# Patient Record
Sex: FEMALE | Race: WHITE
[De-identification: names, ages, dates, MRNs, and addresses within clinical notes are randomized per-mention and may not be internally consistent; named-entity substitution may affect disease eponyms.]

---

## 2017-01-09 ENCOUNTER — HOSPITAL ENCOUNTER (OUTPATIENT)
Dept: HOSPITAL 62 - LAB | Age: 47
End: 2017-01-09
Attending: PHYSICIAN ASSISTANT
Payer: MEDICARE

## 2017-01-09 DIAGNOSIS — R07.82: Primary | ICD-10-CM

## 2017-01-09 DIAGNOSIS — R10.812: ICD-10-CM

## 2017-01-09 LAB — CREAT SERPL-MCNC: 1.1 MG/DL (ref 0.52–1.25)

## 2017-01-09 PROCEDURE — 36415 COLL VENOUS BLD VENIPUNCTURE: CPT

## 2017-01-09 PROCEDURE — 82565 ASSAY OF CREATININE: CPT

## 2017-01-10 ENCOUNTER — HOSPITAL ENCOUNTER (OUTPATIENT)
Dept: HOSPITAL 62 - RAD | Age: 47
End: 2017-01-10
Attending: INTERNAL MEDICINE
Payer: MEDICARE

## 2017-01-10 DIAGNOSIS — R07.82: ICD-10-CM

## 2017-01-10 DIAGNOSIS — R10.12: Primary | ICD-10-CM

## 2017-01-10 PROCEDURE — 74160 CT ABDOMEN W/CONTRAST: CPT

## 2018-02-09 ENCOUNTER — HOSPITAL ENCOUNTER (EMERGENCY)
Dept: HOSPITAL 62 - ER | Age: 48
Discharge: LEFT BEFORE DISCHARGE | End: 2018-02-09
Payer: MEDICARE

## 2018-02-09 VITALS — SYSTOLIC BLOOD PRESSURE: 147 MMHG | DIASTOLIC BLOOD PRESSURE: 64 MMHG

## 2018-02-09 DIAGNOSIS — R06.02: ICD-10-CM

## 2018-02-09 DIAGNOSIS — R07.9: ICD-10-CM

## 2018-02-09 DIAGNOSIS — I10: ICD-10-CM

## 2018-02-09 DIAGNOSIS — E11.9: ICD-10-CM

## 2018-02-09 DIAGNOSIS — E78.00: ICD-10-CM

## 2018-02-09 DIAGNOSIS — F17.200: ICD-10-CM

## 2018-02-09 DIAGNOSIS — Z53.21: Primary | ICD-10-CM

## 2018-02-09 LAB
ADD MANUAL DIFF: NO
ALBUMIN SERPL-MCNC: 4.2 G/DL (ref 3.5–5)
ALP SERPL-CCNC: 93 U/L (ref 38–126)
ALT SERPL-CCNC: 61 U/L (ref 9–52)
ANION GAP SERPL CALC-SCNC: 9 MMOL/L (ref 5–19)
AST SERPL-CCNC: 30 U/L (ref 14–36)
BASOPHILS # BLD AUTO: 0.1 10^3/UL (ref 0–0.2)
BASOPHILS NFR BLD AUTO: 1.1 % (ref 0–2)
BILIRUB DIRECT SERPL-MCNC: 0.4 MG/DL (ref 0–0.4)
BILIRUB SERPL-MCNC: 0.5 MG/DL (ref 0.2–1.3)
BUN SERPL-MCNC: 10 MG/DL (ref 7–20)
CALCIUM: 9.7 MG/DL (ref 8.4–10.2)
CHLORIDE SERPL-SCNC: 109 MMOL/L (ref 98–107)
CK MB SERPL-MCNC: 0.32 NG/ML (ref ?–4.55)
CK SERPL-CCNC: 52 U/L (ref 30–135)
CO2 SERPL-SCNC: 24 MMOL/L (ref 22–30)
EOSINOPHIL # BLD AUTO: 0.4 10^3/UL (ref 0–0.6)
EOSINOPHIL NFR BLD AUTO: 4.1 % (ref 0–6)
ERYTHROCYTE [DISTWIDTH] IN BLOOD BY AUTOMATED COUNT: 13 % (ref 11.5–14)
GLUCOSE SERPL-MCNC: 103 MG/DL (ref 75–110)
HCT VFR BLD CALC: 43.7 % (ref 36–47)
HGB BLD-MCNC: 14.7 G/DL (ref 12–15.5)
LYMPHOCYTES # BLD AUTO: 4.9 10^3/UL (ref 0.5–4.7)
LYMPHOCYTES NFR BLD AUTO: 49.8 % (ref 13–45)
MCH RBC QN AUTO: 30.2 PG (ref 27–33.4)
MCHC RBC AUTO-ENTMCNC: 33.8 G/DL (ref 32–36)
MCV RBC AUTO: 90 FL (ref 80–97)
MONOCYTES # BLD AUTO: 0.6 10^3/UL (ref 0.1–1.4)
MONOCYTES NFR BLD AUTO: 6.5 % (ref 3–13)
NEUTROPHILS # BLD AUTO: 3.8 10^3/UL (ref 1.7–8.2)
NEUTS SEG NFR BLD AUTO: 38.5 % (ref 42–78)
PLATELET # BLD: 245 10^3/UL (ref 150–450)
POTASSIUM SERPL-SCNC: 3.9 MMOL/L (ref 3.6–5)
PROT SERPL-MCNC: 7.1 G/DL (ref 6.3–8.2)
RBC # BLD AUTO: 4.88 10^6/UL (ref 3.72–5.28)
SODIUM SERPL-SCNC: 141.7 MMOL/L (ref 137–145)
TOTAL CELLS COUNTED % (AUTO): 100 %
TROPONIN I SERPL-MCNC: < 0.012 NG/ML
WBC # BLD AUTO: 9.9 10^3/UL (ref 4–10.5)

## 2018-02-09 PROCEDURE — 99285 EMERGENCY DEPT VISIT HI MDM: CPT

## 2018-02-09 PROCEDURE — 36415 COLL VENOUS BLD VENIPUNCTURE: CPT

## 2018-02-09 PROCEDURE — 82553 CREATINE MB FRACTION: CPT

## 2018-02-09 PROCEDURE — 85025 COMPLETE CBC W/AUTO DIFF WBC: CPT

## 2018-02-09 PROCEDURE — 99281 EMR DPT VST MAYX REQ PHY/QHP: CPT

## 2018-02-09 PROCEDURE — 84484 ASSAY OF TROPONIN QUANT: CPT

## 2018-02-09 PROCEDURE — 93010 ELECTROCARDIOGRAM REPORT: CPT

## 2018-02-09 PROCEDURE — 82550 ASSAY OF CK (CPK): CPT

## 2018-02-09 PROCEDURE — 71045 X-RAY EXAM CHEST 1 VIEW: CPT

## 2018-02-09 PROCEDURE — 80053 COMPREHEN METABOLIC PANEL: CPT

## 2018-02-09 PROCEDURE — 93005 ELECTROCARDIOGRAM TRACING: CPT

## 2018-02-09 NOTE — RADIOLOGY REPORT (SQ)
EXAM DESCRIPTION: CHEST SINGLE VIEW



CLINICAL HISTORY: CP



COMPARISON: 7/28/2016



FINDINGS: Single frontal view of the chest.  The

cardiomediastinal silhouette has normal size and contour. No

consolidation, pneumothorax, or pleural effusion.  No displaced

rib fractures identified.  Upper abdominal soft tissues are

unremarkable.



IMPRESSION:



1. No acute pulmonary process identified.

## 2018-02-09 NOTE — EKG REPORT
SEVERITY:- ABNORMAL ECG -

SINUS RHYTHM

ABNRM R PROG, CONSIDER ASMI OR LEAD PLACEMENT

:

Confirmed by: Jonah Garrett 09-Feb-2018 10:04:12

## 2018-02-09 NOTE — ER DOCUMENT REPORT
ED Medical Screen (RME)





- General


Chief Complaint: Chest Pain > 30


Stated Complaint: CHEST PAIN


Time Seen by Provider: 02/09/18 02:19


Mode of Arrival: Medic


Information source: Patient


Notes: 





47-year-old female presents to ED for complaint of chest pain pressure 

shortness of breath for couple days.  She is not able to sleep due to being 

short of breath and the pressure.  She states she is also been stressed out 

recently when her child and grandchildren moved in with her.  She denies any 

history of COPD asthma.  She states she does have high blood pressure 

cholesterol is got diet-controlled diabetes.  She is disabled for a shattered 

disc in her lower back.  She smokes half pack a day denies drinking alcohol or 

using any counter drugs.  She is able to speak in full sentences with no pulse 

for a breath.  Respirations are regular even unlabored and lungs to 

auscultation.  Patient states she was given aspirin and sublingual 

nitroglycerin in the ambulance.














I have greeted and performed a rapid initial assessment of this patient.  A 

comprehensive ED assessment and evaluation of the patient, analysis of test 

results and completion of medical decision making process will be conducted by 

an additional ED providers.


TRAVEL OUTSIDE OF THE [place] IN LAST 30 DAYS: No





- Related Data


Allergies/Adverse Reactions: 


 





No Known Allergies Allergy (Verified 07/28/16 12:57)


 











Past Medical History





- Past Medical History


Cardiac Medical History: Reports: Hx Hypercholesterolemia, Hx Hypertension


Renal/ Medical History: Reports: Hx Kidney Stones


GI Medical History: Reports: Hx Gastroesophageal Reflux Disease


Past Surgical History: Reports: Hx Gynecologic Surgery - lidia oophorectomy, Hx 

Hysterectomy, Hx Orthopedic Surgery - lumbar screws and plate





- Immunizations


Hx Diphtheria, Pertussis, Tetanus Vaccination: No





Physical Exam





- Vital signs


Vitals: 





 











Temp Pulse Resp BP Pulse Ox


 


 98.5 F   82   20   147/64 H  98 


 


 02/09/18 01:51  02/09/18 01:51  02/09/18 01:51  02/09/18 01:51  02/09/18 01:51














Course





- Vital Signs


Vital signs: 





 











Temp Pulse Resp BP Pulse Ox


 


 98.5 F   82   20   147/64 H  98 


 


 02/09/18 01:51  02/09/18 01:51  02/09/18 01:51  02/09/18 01:51  02/09/18 01:51

## 2020-11-24 ENCOUNTER — HOSPITAL ENCOUNTER (OUTPATIENT)
Dept: HOSPITAL 62 - WI | Age: 50
End: 2020-11-24
Attending: PHYSICIAN ASSISTANT
Payer: MEDICARE

## 2020-11-24 DIAGNOSIS — Z12.31: Primary | ICD-10-CM

## 2020-11-24 PROCEDURE — 77067 SCR MAMMO BI INCL CAD: CPT

## 2020-11-24 NOTE — WOMENS IMAGING REPORT
EXAM DESCRIPTION:  BILAT SCREENING MAMMO W/CAD



IMAGES COMPLETED DATE/TIME:  11/24/2020 3:00 pm



REASON FOR STUDY:  ROUTINE SCREENING MAMMOGRAM Z12.31 Z12.31  ENCNTR SCREEN MAMMOGRAM FOR MALIGNANT N
EOPLASM OF CARMELA



COMPARISON:  [facility] back to 2014



EXAM PARAMETERS:  Standard craniocaudal and mediolateral oblique views of each breast recorded using 
digital acquisition.

Read with the assistance of CAD.

.[facility]  Version 9.2



LIMITATIONS:  None.



FINDINGS:  No suspicious masses, suspicious calcifications or architectural distortion. No areas of c
oncern.



IMPRESSION:  NEGATIVE MAMMOGRAM.  BIRADS 1



BREAST DENSITY:  b. There are scattered areas of fibroglandular density.



BIRAD:  ASSESSMENT:  1 NEGATIVE



RECOMMENDATION:  ROUTINE SCREENING



COMMENT:  The patient has been notified of the results by letter per MQSA requirements. Additional no
tification policies are in place for contacting patient with suspicious or incomplete findings.

Quality ID #225: The [employer] recommends an annual screening mammogram for women
 aged 40 years or over. This facility utilizes a reminder system to ensure that all patients receive 
reminder letters, and/or direct phone calls for appointments. This includes reminders for routine scr
eening mammograms, diagnostic mammograms, or other Breast Imaging Interventions when appropriate.  Th
is patient will be placed in the appropriate reminder system.



TECHNICAL DOCUMENTATION:  FINDING NUMBER: (1)

ASSESSMENT: (1)

JOB ID:  [id number]

 2011 [employer]- All Rights Reserved



Reading location - IP/workstation name: [email or web address]